# Patient Record
Sex: MALE | URBAN - METROPOLITAN AREA
[De-identification: names, ages, dates, MRNs, and addresses within clinical notes are randomized per-mention and may not be internally consistent; named-entity substitution may affect disease eponyms.]

---

## 2021-02-22 ENCOUNTER — IMPORTED ENCOUNTER (OUTPATIENT)
Dept: URBAN - METROPOLITAN AREA CLINIC 1 | Facility: CLINIC | Age: 76
End: 2021-02-22

## 2021-02-22 PROBLEM — H25.813: Noted: 2021-02-22

## 2021-02-22 PROCEDURE — 92015 DETERMINE REFRACTIVE STATE: CPT

## 2021-02-22 PROCEDURE — 99204 OFFICE O/P NEW MOD 45 MIN: CPT

## 2021-02-22 NOTE — PATIENT DISCUSSION
1.  Cataract OU -- Visually Significant secnodary to glare OU discussed the risks benefits alternatives and limitations of cataract surgery. The patient stated a full understanding and a desire to proceed with the procedure. The patient will need to return for preop appointment with cataract measurements and to have any additional questions answered and start pre-operative eye drops as directed. Phaco PCL OS then ODReturn for an appointment in [address] [place] [address]  with Dr. Manpreet Fox.

## 2021-03-16 ENCOUNTER — IMPORTED ENCOUNTER (OUTPATIENT)
Dept: URBAN - METROPOLITAN AREA CLINIC 1 | Facility: CLINIC | Age: 76
End: 2021-03-16

## 2021-03-16 PROBLEM — H25.812: Noted: 2021-03-16

## 2021-03-16 PROCEDURE — 92136 OPHTHALMIC BIOMETRY: CPT

## 2021-03-25 ENCOUNTER — IMPORTED ENCOUNTER (OUTPATIENT)
Dept: URBAN - METROPOLITAN AREA CLINIC 1 | Facility: CLINIC | Age: 76
End: 2021-03-25

## 2021-03-25 PROBLEM — Z96.1: Noted: 2021-03-25

## 2021-03-25 PROCEDURE — 99024 POSTOP FOLLOW-UP VISIT: CPT

## 2021-04-01 ENCOUNTER — IMPORTED ENCOUNTER (OUTPATIENT)
Dept: URBAN - METROPOLITAN AREA CLINIC 1 | Facility: CLINIC | Age: 76
End: 2021-04-01

## 2021-04-01 PROBLEM — H25.811: Noted: 2021-04-01

## 2021-04-01 PROCEDURE — 92136 OPHTHALMIC BIOMETRY: CPT

## 2021-04-07 ENCOUNTER — IMPORTED ENCOUNTER (OUTPATIENT)
Dept: URBAN - METROPOLITAN AREA CLINIC 1 | Facility: CLINIC | Age: 76
End: 2021-04-07

## 2021-04-08 ENCOUNTER — IMPORTED ENCOUNTER (OUTPATIENT)
Dept: URBAN - METROPOLITAN AREA CLINIC 1 | Facility: CLINIC | Age: 76
End: 2021-04-08

## 2021-04-08 PROBLEM — Z96.1: Noted: 2021-04-08

## 2021-04-08 PROCEDURE — 99024 POSTOP FOLLOW-UP VISIT: CPT

## 2021-04-08 NOTE — PATIENT DISCUSSION
1. POD#1 Phaco/ PCL Standard OD- doing well. Tiffanie Speak in place. Mild Edema noted. 1 gtt combigan instilled in office. Use Prednisolone BID OD Prolensa Qdaily OD Ocuflox TID OD: Use all three gtts through completion of PO gtt chart regimen/ Per our instructions given. Post op Warnings Reiterated 2. POW#3 Phaco/ PCL Standard OS- doing well. Tiffanie Speak in place. Use Prednisolone BID OS && Prolensa Qdaily OS: Use through completion of po gtt regimen.  RTC as scheduled

## 2021-04-29 ENCOUNTER — IMPORTED ENCOUNTER (OUTPATIENT)
Dept: URBAN - METROPOLITAN AREA CLINIC 1 | Facility: CLINIC | Age: 76
End: 2021-04-29

## 2021-04-29 PROBLEM — Z96.1: Noted: 2021-04-29

## 2021-04-29 PROCEDURE — 99024 POSTOP FOLLOW-UP VISIT: CPT

## 2021-04-29 NOTE — PATIENT DISCUSSION
POM#1 CE/IOL OU (Standard OU) doing well. *Dextenza in place  Use Prednisolone BID OD & Prolensa Qdaily OU: Use through completion of po gtt regimen. Return for an appointment in 4 months 30/MRX with Dr. Yamilet Steinberg.  (Consider full range specs at next visit)

## 2021-05-07 ENCOUNTER — IMPORTED ENCOUNTER (OUTPATIENT)
Dept: URBAN - METROPOLITAN AREA CLINIC 1 | Facility: CLINIC | Age: 76
End: 2021-05-07

## 2021-05-07 PROBLEM — H04.123: Noted: 2021-05-07

## 2021-05-07 PROCEDURE — 99213 OFFICE O/P EST LOW 20 MIN: CPT

## 2021-05-07 NOTE — PATIENT DISCUSSION
1.  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 Recommend patient use OTC ATs TID OU routinely. 2.  Pseudophakia OU -- Standard OU. Doing well. Return for an appointment in [place] scheduled in August for a 27 with Dr. Jerri Briggs.

## 2021-08-31 ENCOUNTER — IMPORTED ENCOUNTER (OUTPATIENT)
Dept: URBAN - METROPOLITAN AREA CLINIC 1 | Facility: CLINIC | Age: 76
End: 2021-08-31

## 2021-08-31 PROBLEM — H16.143: Noted: 2021-08-31

## 2021-08-31 PROBLEM — H04.123: Noted: 2021-08-31

## 2021-08-31 PROBLEM — Z96.1: Noted: 2021-08-31

## 2021-08-31 PROBLEM — H26.493: Noted: 2021-08-31

## 2021-08-31 PROCEDURE — 99214 OFFICE O/P EST MOD 30 MIN: CPT

## 2021-08-31 PROCEDURE — 92015 DETERMINE REFRACTIVE STATE: CPT

## 2021-08-31 NOTE — PATIENT DISCUSSION
1.  VALENTINE w/ PEK OU -- Continue routine use of OTC ATs BID-TID OU. 2.  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 3. Pseudophakia OU -- Standard OU. Doing well. Return for an appointment in 1 year for a 30/glare with Dr. Ashley Lagos.

## 2022-04-02 ASSESSMENT — TONOMETRY
OS_IOP_MMHG: 18
OS_IOP_MMHG: 13
OS_IOP_MMHG: 13
OD_IOP_MMHG: 13
OD_IOP_MMHG: 20
OD_IOP_MMHG: 13
OD_IOP_MMHG: 16
OD_IOP_MMHG: 13
OD_IOP_MMHG: 16
OS_IOP_MMHG: 14
OS_IOP_MMHG: 16
OS_IOP_MMHG: 17
OS_IOP_MMHG: 15
OS_IOP_MMHG: 13

## 2022-04-02 ASSESSMENT — VISUAL ACUITY
OD_PH: SC 20/30
OD_CC: 20/40
OS_CC: 20/40
OS_CC: 20/30
OS_CC: 20/40
OD_CC: 20/20
OD_CC: 20/50
OD_GLARE: 20/70
OS_GLARE: 20/70
OS_GLARE: 20/70
OS_PH: SC 20/25
OD_CC: 20/40
OD_GLARE: 20/80
OS_CC: 20/30
OD_CC: 20/20
OD_SC: 20/25
OS_CC: 20/25
OS_CC: 20/20
OS_CC: 20/30+2
OS_SC: 20/25
OS_CC: 20/20-1
OD_CC: 20/50

## 2022-09-06 ENCOUNTER — COMPREHENSIVE EXAM (OUTPATIENT)
Dept: URBAN - METROPOLITAN AREA CLINIC 1 | Facility: CLINIC | Age: 77
End: 2022-09-06

## 2022-09-06 DIAGNOSIS — H26.493: ICD-10-CM

## 2022-09-06 DIAGNOSIS — H35.373: ICD-10-CM

## 2022-09-06 PROCEDURE — 99214 OFFICE O/P EST MOD 30 MIN: CPT

## 2022-09-06 ASSESSMENT — VISUAL ACUITY
OS_CC: J1+
OD_BAT: 20/50
OD_SC: 20/20
OS_SC: 20/20
OS_BAT: 20/50
OD_CC: J1+

## 2022-09-06 ASSESSMENT — TONOMETRY
OS_IOP_MMHG: 12
OD_IOP_MMHG: 12

## 2022-09-06 NOTE — PATIENT DISCUSSION
OS > OD. Observe and consider YAG cap when pt feels pco visually significant and visual acuity decreases to appropriate level.

## 2023-09-07 ENCOUNTER — COMPREHENSIVE EXAM (OUTPATIENT)
Dept: URBAN - METROPOLITAN AREA CLINIC 1 | Facility: CLINIC | Age: 78
End: 2023-09-07

## 2023-09-07 DIAGNOSIS — H16.143: ICD-10-CM

## 2023-09-07 DIAGNOSIS — H26.493: ICD-10-CM

## 2023-09-07 DIAGNOSIS — H04.123: ICD-10-CM

## 2023-09-07 DIAGNOSIS — H35.373: ICD-10-CM

## 2023-09-07 DIAGNOSIS — Z96.1: ICD-10-CM

## 2023-09-07 PROCEDURE — 99214 OFFICE O/P EST MOD 30 MIN: CPT

## 2023-09-07 ASSESSMENT — VISUAL ACUITY
OS_SC: 20/20
OD_SC: 20/20
OS_BAT: 20/50
OD_BAT: 20/50

## 2023-09-07 ASSESSMENT — TONOMETRY
OD_IOP_MMHG: 12
OS_IOP_MMHG: 12

## 2024-09-09 ENCOUNTER — COMPREHENSIVE EXAM (OUTPATIENT)
Dept: URBAN - METROPOLITAN AREA CLINIC 1 | Facility: CLINIC | Age: 79
End: 2024-09-09

## 2024-09-09 DIAGNOSIS — H01.021: ICD-10-CM

## 2024-09-09 DIAGNOSIS — Z96.1: ICD-10-CM

## 2024-09-09 DIAGNOSIS — H26.493: ICD-10-CM

## 2024-09-09 DIAGNOSIS — H04.123: ICD-10-CM

## 2024-09-09 DIAGNOSIS — H16.143: ICD-10-CM

## 2024-09-09 DIAGNOSIS — H35.373: ICD-10-CM

## 2024-09-09 DIAGNOSIS — H01.024: ICD-10-CM

## 2024-09-09 PROCEDURE — 99214 OFFICE O/P EST MOD 30 MIN: CPT

## 2024-09-09 PROCEDURE — 92015 DETERMINE REFRACTIVE STATE: CPT

## 2024-09-26 NOTE — PATIENT DISCUSSION
1.  Cataract OD -- Visually Significant secondary to glare discussed the risks benefits alternatives and limitations of cataract surgery. The patient stated a full understanding and a desire to proceed with the procedure. Discussed with patient if PO Gtts are more than $120 for all three combined when filling at their Pharmacy please call our office to request generic substitutions. Pt understands they will need glasses post-op to achieve their best corrected vision. Phaco PCL OD 2. POW#3  CE/IOL Standard OS doing well. Ever Skates in place. Use Prednisolone BID OS && Prolensa Qdaily OS: Use through completion of po gtt regimen.  F/u as scheduled 2nd eye Improved

## 2024-09-27 ENCOUNTER — CLINIC PROCEDURE ONLY (OUTPATIENT)
Dept: URBAN - METROPOLITAN AREA CLINIC 1 | Facility: CLINIC | Age: 79
End: 2024-09-27

## 2024-09-27 DIAGNOSIS — H26.493: ICD-10-CM

## 2024-09-27 DIAGNOSIS — Z96.1: ICD-10-CM

## 2024-09-27 PROCEDURE — 66821 AFTER CATARACT LASER SURGERY: CPT

## 2024-10-25 ENCOUNTER — CLINIC PROCEDURE ONLY (OUTPATIENT)
Dept: URBAN - METROPOLITAN AREA CLINIC 1 | Facility: CLINIC | Age: 79
End: 2024-10-25

## 2024-10-25 DIAGNOSIS — Z96.1: ICD-10-CM

## 2024-10-25 DIAGNOSIS — H26.492: ICD-10-CM

## 2024-10-25 PROCEDURE — 66821 AFTER CATARACT LASER SURGERY: CPT | Mod: 79,LT

## 2024-11-06 ENCOUNTER — DIAGNOSTICS ONLY (OUTPATIENT)
Dept: URBAN - METROPOLITAN AREA CLINIC 1 | Facility: CLINIC | Age: 79
End: 2024-11-06

## 2024-11-06 DIAGNOSIS — Z96.1: ICD-10-CM

## 2024-11-06 DIAGNOSIS — Z98.890: ICD-10-CM

## 2024-11-06 PROCEDURE — 92015 DETERMINE REFRACTIVE STATE: CPT | Mod: NC

## 2025-03-18 ENCOUNTER — EMERGENCY VISIT (OUTPATIENT)
Age: 80
End: 2025-03-18

## 2025-03-18 DIAGNOSIS — H57.813: ICD-10-CM

## 2025-03-18 DIAGNOSIS — S05.92XA: ICD-10-CM

## 2025-03-18 PROCEDURE — 99213 OFFICE O/P EST LOW 20 MIN: CPT
